# Patient Record
Sex: FEMALE | Race: ASIAN | NOT HISPANIC OR LATINO
[De-identification: names, ages, dates, MRNs, and addresses within clinical notes are randomized per-mention and may not be internally consistent; named-entity substitution may affect disease eponyms.]

---

## 2020-03-31 RX ADMIN — HEPARIN SODIUM 5000 UNIT(S): 5000 INJECTION INTRAVENOUS; SUBCUTANEOUS at 21:05

## 2021-03-08 PROBLEM — Z00.00 ENCOUNTER FOR PREVENTIVE HEALTH EXAMINATION: Status: ACTIVE | Noted: 2021-03-08

## 2021-03-09 PROBLEM — C34.90 LUNG CANCER: Status: ACTIVE | Noted: 2021-03-09

## 2021-03-11 ENCOUNTER — APPOINTMENT (OUTPATIENT)
Dept: THORACIC SURGERY | Facility: CLINIC | Age: 46
End: 2021-03-11
Payer: COMMERCIAL

## 2021-03-11 VITALS
WEIGHT: 136 LBS | BODY MASS INDEX: 23.8 KG/M2 | RESPIRATION RATE: 17 BRPM | HEART RATE: 80 BPM | OXYGEN SATURATION: 98 % | SYSTOLIC BLOOD PRESSURE: 121 MMHG | TEMPERATURE: 98 F | HEIGHT: 63.39 IN | DIASTOLIC BLOOD PRESSURE: 81 MMHG

## 2021-03-11 DIAGNOSIS — C34.90 MALIGNANT NEOPLASM OF UNSPECIFIED PART OF UNSPECIFIED BRONCHUS OR LUNG: ICD-10-CM

## 2021-03-11 DIAGNOSIS — Z85.118 PERSONAL HISTORY OF OTHER MALIGNANT NEOPLASM OF BRONCHUS AND LUNG: ICD-10-CM

## 2021-03-11 DIAGNOSIS — Z86.79 PERSONAL HISTORY OF OTHER DISEASES OF THE CIRCULATORY SYSTEM: ICD-10-CM

## 2021-03-11 PROCEDURE — 99205 OFFICE O/P NEW HI 60 MIN: CPT

## 2021-03-11 PROCEDURE — 99072 ADDL SUPL MATRL&STAF TM PHE: CPT

## 2021-03-12 ENCOUNTER — NON-APPOINTMENT (OUTPATIENT)
Age: 46
End: 2021-03-12

## 2021-03-12 PROBLEM — Z85.118 HISTORY OF MALIGNANT NEOPLASM OF LUNG: Status: RESOLVED | Noted: 2021-03-12 | Resolved: 2021-03-12

## 2021-03-12 PROBLEM — Z86.79 HISTORY OF HYPERTENSION: Status: RESOLVED | Noted: 2021-03-12 | Resolved: 2021-03-12

## 2021-03-12 RX ORDER — LORATADINE 10 MG/1
10 TABLET ORAL
Refills: 0 | Status: ACTIVE | COMMUNITY

## 2021-03-12 RX ORDER — OSIMERTINIB 80 1/1
80 TABLET, FILM COATED ORAL
Refills: 0 | Status: ACTIVE | COMMUNITY

## 2021-03-12 RX ORDER — AMLODIPINE BESYLATE 5 MG/1
5 TABLET ORAL
Refills: 0 | Status: ACTIVE | COMMUNITY

## 2021-03-15 NOTE — HISTORY OF PRESENT ILLNESS
[FreeTextEntry1] : Ms. YARELI UMANZOR, 46 year old female, never smoker, w/ hx of HTN, lung cancer diagnosed in 02/2015 in [place] and f/u with Dr. Austin Brothers since 03/11/2015 for Stage IV (M1b), She is s/p CT guided bx revealed lung adenocarcinoma, PET/CT on 05/16/2015 revealed SOCORRO mass and a few metastatic nodules in both lungs. s/p completed cyberknife treatment on 05/19/2017, s/p left lung mass bx on 02/13/2019 revealed fibroelastotic stroma with carbon deposits, negative for malignancy. D/c Tarceva on 01/15/2019 due to inefficacy and started on Targrisso 80 mg same day, given allergic reaction of rash discontinued on 02/06/2019 and restarted on 07/2019 until now. 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 \par \par CT chest 7/2/2020:\par - 3.0 x 1.5 cm left perihilar masslike consolidation (3: 56)\par - 1.0 x 0.6 cm stable right upper lobe nodule (3: 63)\par - a stable 0.4 cm pulmonary nodule LLL (3: 61) \par \par CT chest 1/5/2021:\par - 3.2 x 1.7 cm stable left perihilar masslike consolidation (5: 52)\par - 1.0 x 0.8 cm RUL pulmonary nodule (5: 60), slightly interval increase in size, previously 1.0 x 0.6 cm\par - stable 3 mm pulmonary nodule in the SOCORRO (5: 34),likely postinflammatory \par - previously noted 0.4 cm nodule in the LLL is not identified on current examination\par - small calcified granuloma noted at the lingula\par - stable node adjacent to the aortic arch measuring 0.6 cm in short axis (4:26)\par \par CT Neck 1/5/2021:\par - left local cord paralysis\par - 7 mm prevascular lymph node in short axis, which may lie along the course of the left recurrent laryngeal nerve, decreased in size, previously 9 mm \par \par Patient is here today for CT Sx consultation, referred by Dr. Austin Brothers (Hem/Onc). Patient denies shortness of breath, cough, chest pain, fever, chills, loss of appetite, weight loss, or hemoptysis.\par \par

## 2021-03-15 NOTE — CONSULT LETTER
[Consult Letter:] : I had the pleasure of evaluating your patient, [unfilled]. [( Thank you for referring [unfilled] for consultation for _____ )] : Thank you for referring [unfilled] for consultation for [unfilled] [Please see my note below.] : Please see my note below. [Consult Closing:] : Thank you very much for allowing me to participate in the care of this patient.  If you have any questions, please do not hesitate to contact me. [Sincerely,] : Sincerely, [Dear  ___] : Dear  [unfilled], [DrJuno  ___] : Dr. SIMPSON [FreeTextEntry2] : Dr. Austin Brothers (Hem/Onc/Ref)\par Dr. John Parikh (PCP) [FreeTextEntry3] : Nico Haynes MD, MPH \par System Director of Thoracic Surgery \par Director of Comprehensive Lung and Foregut Sabin \par Professor Cardiovascular & Thoracic Surgery  \par [employer] [facility] [employer] [facility]\par \par [facility]\par [address]\par Oncology [address]\[place] [address]\par Tel: [phone]\par Fax: [phone]\par

## 2021-03-15 NOTE — ASSESSMENT
[FreeTextEntry1] : Ms. YARELI UMANZOR, 46 year old female, never smoker, w/ hx of HTN, lung cancer diagnosed in 02/2015 in [place] and f/u with Dr. Austin Brothers since 03/11/2015 for Stage IV (M1b), She is s/p CT guided bx revealed lung adenocarcinoma, PET/CT on 05/16/2015 revealed SOCORRO mass and a few metastatic nodules in both lungs. s/p completed cyberknife treatment on 05/19/2017, s/p left lung mass bx on 02/13/2019 revealed fibroelastotic stroma with carbon deposits, negative for malignancy. D/c Tarceva on 01/15/2019 due to inefficacy and started on Targrisso 80 mg same day, given allergic reaction of rash discontinued on 02/06/2019 and restarted on 07/2019 until now. 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 \par \par CT chest 7/2/2020:\par - 3.0 x 1.5 cm left perihilar masslike consolidation (3: 56)\par - 1.0 x 0.6 cm stable right upper lobe nodule (3: 63)\par - a stable 0.4 cm pulmonary nodule LLL (3: 61) \par \par CT chest 1/5/2021:\par - 3.2 x 1.7 cm stable left perihilar masslike consolidation (5: 52)\par - 1.0 x 0.8 cm RUL pulmonary nodule (5: 60), slightly interval increase in size, previously 1.0 x 0.6 cm\par - stable 3 mm pulmonary nodule in the SOCORRO (5: 34),likely postinflammatory \par - previously noted 0.4 cm nodule in the LLL is not identified on current examination\par - small calcified granuloma noted at the lingula\par - stable node adjacent to the aortic arch measuring 0.6 cm in short axis (4:26)\par \par CT Neck 1/5/2021:\par - left local cord paralysis\par - 7 mm prevascular lymph node in short axis, which may lie along the course of the left recurrent laryngeal nerve, decreased in size, previously 9 mm \par \par I have reviewed the patient's medical records and diagnostic images at time of this office consultation and have made the following recommendation:\par 1. CT chest reviewed and explained to patient, RUL nodule increased in size, I recommended a Right VATS, Robotic-assisted, RUL wedge resection, MLND on 03/31/2021. Risks and benefits and alternatives explained to patient, all questions answered, patient agreed to proceed with surgery.\par 2. PFTs with Dr. Cristian Keen\par 3. PET/CT\par 4. MRI brain w/w/o contrast\par 5. Medical clearance and PST. \par \par I personally performed the services described in the documentation, reviewed the documentation recorded by the scribe in my presence and it accurately and completely records my words and actions.\par \par I, Catrachita Be NP, am scribing for and the presence of BLANCA Caicedo, the following sections HISTORY OF PRESENT ILLNESS, PAST MEDICAL/FAMILY/SOCIAL HISTORY; REVIEW OF SYSTEMS; VITAL SIGNS; PHYSICAL EXAM; DISPOSITION.

## 2021-03-15 NOTE — DATA REVIEWED
[FreeTextEntry1] : CT chest 7/2/2020:\par - 3.0 x 1.5 cm left perihilar masslike consolidation (3: 56)\par - 1.0 x 0.6 cm stable right upper lobe nodule (3: 63)\par - a stable 0.4 cm pulmonary nodule LLL (3: 61) \par \par CT chest 1/5/2021:\par - 3.2 x 1.7 cm stable left perihilar masslike consolidation (5: 52)\par - 1.0 x 0.8 cm RUL pulmonary nodule (5: 60), slightly interval increase in size, previously 1.0 x 0.6 cm\par - stable 3 mm pulmonary nodule in the SOCORRO (5: 34),likely postinflammatory \par - previously noted 0.4 cm nodule in the LLL is not identified on current examination\par - small calcified granuloma noted at the lingula\par - stable node adjacent to the aortic arch measuring 0.6 cm in short axis (4:26)\par \par CT Neck 1/5/2021:\par - left local cord paralysis\par - 7 mm prevascular lymph node in short axis, which may lie along the course of the left recurrent laryngeal nerve, decreased in size, previously 9 mm

## 2021-03-22 ENCOUNTER — OUTPATIENT (OUTPATIENT)
Dept: OUTPATIENT SERVICES | Facility: HOSPITAL | Age: 46
LOS: 1 days | End: 2021-03-22
Payer: COMMERCIAL

## 2021-03-22 VITALS
OXYGEN SATURATION: 98 % | SYSTOLIC BLOOD PRESSURE: 133 MMHG | HEART RATE: 87 BPM | DIASTOLIC BLOOD PRESSURE: 82 MMHG | TEMPERATURE: 97 F | RESPIRATION RATE: 16 BRPM | WEIGHT: 138.01 LBS | HEIGHT: 62 IN

## 2021-03-22 DIAGNOSIS — C34.90 MALIGNANT NEOPLASM OF UNSPECIFIED PART OF UNSPECIFIED BRONCHUS OR LUNG: ICD-10-CM

## 2021-03-22 DIAGNOSIS — T78.40XA ALLERGY, UNSPECIFIED, INITIAL ENCOUNTER: ICD-10-CM

## 2021-03-22 DIAGNOSIS — I10 ESSENTIAL (PRIMARY) HYPERTENSION: ICD-10-CM

## 2021-03-22 LAB
ANION GAP SERPL CALC-SCNC: 10 MMOL/L — SIGNIFICANT CHANGE UP (ref 7–14)
BLD GP AB SCN SERPL QL: NEGATIVE — SIGNIFICANT CHANGE UP
BUN SERPL-MCNC: 15 MG/DL — SIGNIFICANT CHANGE UP (ref 7–23)
CALCIUM SERPL-MCNC: 9 MG/DL — SIGNIFICANT CHANGE UP (ref 8.4–10.5)
CHLORIDE SERPL-SCNC: 102 MMOL/L — SIGNIFICANT CHANGE UP (ref 98–107)
CO2 SERPL-SCNC: 27 MMOL/L — SIGNIFICANT CHANGE UP (ref 22–31)
CREAT SERPL-MCNC: 0.86 MG/DL — SIGNIFICANT CHANGE UP (ref 0.5–1.3)
GLUCOSE SERPL-MCNC: 119 MG/DL — HIGH (ref 70–99)
HCG UR QL: NEGATIVE — SIGNIFICANT CHANGE UP
HCT VFR BLD CALC: 37.2 % — SIGNIFICANT CHANGE UP (ref 34.5–45)
HGB BLD-MCNC: 11.9 G/DL — SIGNIFICANT CHANGE UP (ref 11.5–15.5)
MCHC RBC-ENTMCNC: 30.9 PG — SIGNIFICANT CHANGE UP (ref 27–34)
MCHC RBC-ENTMCNC: 32 GM/DL — SIGNIFICANT CHANGE UP (ref 32–36)
MCV RBC AUTO: 96.6 FL — SIGNIFICANT CHANGE UP (ref 80–100)
NRBC # BLD: 0 /100 WBCS — SIGNIFICANT CHANGE UP
NRBC # FLD: 0 K/UL — SIGNIFICANT CHANGE UP
PLATELET # BLD AUTO: 211 K/UL — SIGNIFICANT CHANGE UP (ref 150–400)
POTASSIUM SERPL-MCNC: 3.6 MMOL/L — SIGNIFICANT CHANGE UP (ref 3.5–5.3)
POTASSIUM SERPL-SCNC: 3.6 MMOL/L — SIGNIFICANT CHANGE UP (ref 3.5–5.3)
RBC # BLD: 3.85 M/UL — SIGNIFICANT CHANGE UP (ref 3.8–5.2)
RBC # FLD: 14.3 % — SIGNIFICANT CHANGE UP (ref 10.3–14.5)
RH IG SCN BLD-IMP: POSITIVE — SIGNIFICANT CHANGE UP
SODIUM SERPL-SCNC: 139 MMOL/L — SIGNIFICANT CHANGE UP (ref 135–145)
WBC # BLD: 5.79 K/UL — SIGNIFICANT CHANGE UP (ref 3.8–10.5)
WBC # FLD AUTO: 5.79 K/UL — SIGNIFICANT CHANGE UP (ref 3.8–10.5)

## 2021-03-22 PROCEDURE — 93010 ELECTROCARDIOGRAM REPORT: CPT

## 2021-03-22 RX ORDER — SODIUM CHLORIDE 9 MG/ML
1000 INJECTION, SOLUTION INTRAVENOUS
Refills: 0 | Status: DISCONTINUED | OUTPATIENT
Start: 2021-03-30 | End: 2021-04-01

## 2021-03-22 NOTE — H&P PST ADULT - NSICDXPROBLEM_GEN_ALL_CORE_FT
PROBLEM DIAGNOSES  Problem: Malignant neoplasm of lung  Assessment and Plan: Pt is scheduled for right video assisted thoracoscopy, robotic assisted right upper lobe wedge resection, mediastinal lymph node dissection on 3/30/21.  Verbal and written pre op instructions reviewed with patient and pt able to verbalize understanding.   Verbal and written instructions given with chlorhexidine wash, pt able to verbalize understanding via teach back method. COVID testing scheduled preop per pt.    Sterile cup given, pt instructed to bring urine sample AM of surgery for UCG and pt able to verbalize understanding.     Problem: HTN (hypertension)  Assessment and Plan: Pt instructed to take amlodipine AM of surgery with a sip of water, pt able to verbalize understanding.        PROBLEM DIAGNOSES  Problem: Malignant neoplasm of lung  Assessment and Plan: Pt is scheduled for right video assisted thoracoscopy, robotic assisted right upper lobe wedge resection, mediastinal lymph node dissection on 3/30/21.  Verbal and written pre op instructions reviewed with patient and pt able to verbalize understanding.   Verbal and written instructions given with chlorhexidine wash, pt able to verbalize understanding via teach back method. COVID testing scheduled preop per pt.    Sterile cup given, pt instructed to bring urine sample AM of surgery for UCG and pt able to verbalize understanding.     Problem: Allergy  Assessment and Plan: OR booking notified of pt's seafood allergy status via fax.     Problem: HTN (hypertension)  Assessment and Plan: Pt instructed to take amlodipine AM of surgery with a sip of water, pt able to verbalize understanding.

## 2021-03-22 NOTE — H&P PST ADULT - ITE SK HX ROS MEA POS PC
"from taking tagrisso I've been itching for 2 years, its a side effect"/itching "from taking tagrisso I've been itching for 2 years, its a side effect, that's why I take the loratidine"/itching

## 2021-03-22 NOTE — H&P PST ADULT - GASTROINTESTINAL DETAILS
I personally performed the service described in the documentation recorded by the scribe in my presence, and it accurately and completely records my words and actions.
soft/nontender/no distention/bowel sounds normal

## 2021-03-22 NOTE — H&P PST ADULT - NEGATIVE NEUROLOGICAL SYMPTOMS
no transient paralysis/no weakness/no paresthesias/no generalized seizures/no tremors/no vertigo/no difficulty walking/no confusion

## 2021-03-22 NOTE — H&P PST ADULT - NSANTHOSAYNRD_GEN_A_CORE
No. HARLEY screening performed.  STOP BANG Legend: 0-2 = LOW Risk; 3-4 = INTERMEDIATE Risk; 5-8 = HIGH Risk

## 2021-03-22 NOTE — H&P PST ADULT - HISTORY OF PRESENT ILLNESS
45 yo female with preop dx. malignant neoplasm of lung presents to pre surgical testing.  Pt with h/o metastatic lung cancer 2015, last chemo treatment 2019 and SBRT 2020.  Pt s/p surveillance CT 1/2021 revealing right lung mass has increased in size.  Pt is scheduled for right video assisted thoracoscopy, robotic assisted right upper lobe wedge resection, mediastinal lymph node dissection.

## 2021-03-22 NOTE — H&P PST ADULT - NSICDXPASTMEDICALHX_GEN_ALL_CORE_FT
PAST MEDICAL HISTORY:  HTN (hypertension)     Lung cancer left s/p chemo(last in 2019) RT(last in 2020)    Malignant neoplasm of lung

## 2021-03-26 DIAGNOSIS — Z01.818 ENCOUNTER FOR OTHER PREPROCEDURAL EXAMINATION: ICD-10-CM

## 2021-03-27 ENCOUNTER — APPOINTMENT (OUTPATIENT)
Dept: DISASTER EMERGENCY | Facility: CLINIC | Age: 46
End: 2021-03-27

## 2021-03-28 ENCOUNTER — APPOINTMENT (OUTPATIENT)
Dept: DISASTER EMERGENCY | Facility: CLINIC | Age: 46
End: 2021-03-28

## 2021-03-28 LAB — SARS-COV-2 N GENE NPH QL NAA+PROBE: NOT DETECTED

## 2021-03-29 NOTE — ASU PATIENT PROFILE, ADULT - PMH
HTN (hypertension)    Lung cancer  left s/p chemo(last in 2019) RT(last in 2020)  Malignant neoplasm of lung

## 2021-03-29 NOTE — ASU PATIENT PROFILE, ADULT - BLOOD TRANSFUSION, PREVIOUS, PROFILE
Discussed recent events and echo with MARILYN BEAR patch advised.    Order placed.    Pt called.  No answer. LVM with echo results and request for Zio and scheduling instructions.    no

## 2021-03-30 ENCOUNTER — APPOINTMENT (OUTPATIENT)
Dept: THORACIC SURGERY | Facility: HOSPITAL | Age: 46
End: 2021-03-30

## 2021-03-30 ENCOUNTER — RESULT REVIEW (OUTPATIENT)
Age: 46
End: 2021-03-30

## 2021-03-30 ENCOUNTER — INPATIENT (INPATIENT)
Facility: HOSPITAL | Age: 46
LOS: 1 days | Discharge: HOME | End: 2021-04-01
Attending: THORACIC SURGERY (CARDIOTHORACIC VASCULAR SURGERY) | Admitting: THORACIC SURGERY (CARDIOTHORACIC VASCULAR SURGERY)
Payer: COMMERCIAL

## 2021-03-30 VITALS
RESPIRATION RATE: 16 BRPM | WEIGHT: 138.01 LBS | SYSTOLIC BLOOD PRESSURE: 107 MMHG | TEMPERATURE: 98 F | HEIGHT: 62 IN | DIASTOLIC BLOOD PRESSURE: 72 MMHG | OXYGEN SATURATION: 98 % | HEART RATE: 64 BPM

## 2021-03-30 DIAGNOSIS — C34.90 MALIGNANT NEOPLASM OF UNSPECIFIED PART OF UNSPECIFIED BRONCHUS OR LUNG: ICD-10-CM

## 2021-03-30 LAB
GRAM STN FLD: SIGNIFICANT CHANGE UP
GRAM STN FLD: SIGNIFICANT CHANGE UP
HCG UR QL: NEGATIVE — SIGNIFICANT CHANGE UP
NIGHT BLUE STAIN TISS: SIGNIFICANT CHANGE UP
SPECIMEN SOURCE: SIGNIFICANT CHANGE UP

## 2021-03-30 PROCEDURE — 32666 THORACOSCOPY W/WEDGE RESECT: CPT | Mod: AS,RT

## 2021-03-30 PROCEDURE — 32666 THORACOSCOPY W/WEDGE RESECT: CPT | Mod: RT

## 2021-03-30 PROCEDURE — 99233 SBSQ HOSP IP/OBS HIGH 50: CPT

## 2021-03-30 PROCEDURE — S2900 ROBOTIC SURGICAL SYSTEM: CPT | Mod: NC

## 2021-03-30 PROCEDURE — 88312 SPECIAL STAINS GROUP 1: CPT | Mod: 26

## 2021-03-30 PROCEDURE — 88307 TISSUE EXAM BY PATHOLOGIST: CPT | Mod: 26

## 2021-03-30 PROCEDURE — 71045 X-RAY EXAM CHEST 1 VIEW: CPT | Mod: 26

## 2021-03-30 RX ORDER — ACETAMINOPHEN 500 MG
975 TABLET ORAL ONCE
Refills: 0 | Status: COMPLETED | OUTPATIENT
Start: 2021-03-30 | End: 2021-03-30

## 2021-03-30 RX ORDER — LORATADINE 10 MG/1
1 TABLET ORAL
Qty: 0 | Refills: 0 | DISCHARGE

## 2021-03-30 RX ORDER — GABAPENTIN 400 MG/1
300 CAPSULE ORAL ONCE
Refills: 0 | Status: COMPLETED | OUTPATIENT
Start: 2021-03-30 | End: 2021-03-30

## 2021-03-30 RX ORDER — ACETAMINOPHEN 500 MG
1000 TABLET ORAL ONCE
Refills: 0 | Status: COMPLETED | OUTPATIENT
Start: 2021-03-30 | End: 2022-02-26

## 2021-03-30 RX ORDER — HYDROMORPHONE HYDROCHLORIDE 2 MG/ML
30 INJECTION INTRAMUSCULAR; INTRAVENOUS; SUBCUTANEOUS
Refills: 0 | Status: DISCONTINUED | OUTPATIENT
Start: 2021-03-30 | End: 2021-03-31

## 2021-03-30 RX ORDER — METOCLOPRAMIDE HCL 10 MG
10 TABLET ORAL EVERY 8 HOURS
Refills: 0 | Status: DISCONTINUED | OUTPATIENT
Start: 2021-03-30 | End: 2021-04-01

## 2021-03-30 RX ORDER — NALOXONE HYDROCHLORIDE 4 MG/.1ML
0.1 SPRAY NASAL
Refills: 0 | Status: DISCONTINUED | OUTPATIENT
Start: 2021-03-30 | End: 2021-03-31

## 2021-03-30 RX ORDER — OSIMERTINIB 80 1/1
1 TABLET, FILM COATED ORAL
Qty: 0 | Refills: 0 | DISCHARGE

## 2021-03-30 RX ORDER — ONDANSETRON 8 MG/1
4 TABLET, FILM COATED ORAL EVERY 6 HOURS
Refills: 0 | Status: DISCONTINUED | OUTPATIENT
Start: 2021-03-30 | End: 2021-03-31

## 2021-03-30 RX ORDER — AMLODIPINE BESYLATE 2.5 MG/1
1 TABLET ORAL
Qty: 0 | Refills: 0 | DISCHARGE

## 2021-03-30 RX ORDER — HYDROMORPHONE HYDROCHLORIDE 2 MG/ML
0.5 INJECTION INTRAMUSCULAR; INTRAVENOUS; SUBCUTANEOUS
Refills: 0 | Status: DISCONTINUED | OUTPATIENT
Start: 2021-03-30 | End: 2021-03-31

## 2021-03-30 RX ORDER — HEPARIN SODIUM 5000 [USP'U]/ML
5000 INJECTION INTRAVENOUS; SUBCUTANEOUS EVERY 8 HOURS
Refills: 0 | Status: DISCONTINUED | OUTPATIENT
Start: 2021-03-30 | End: 2021-04-01

## 2021-03-30 RX ORDER — LIDOCAINE 4 G/100G
1 CREAM TOPICAL DAILY
Refills: 0 | Status: DISCONTINUED | OUTPATIENT
Start: 2021-03-30 | End: 2021-04-01

## 2021-03-30 RX ORDER — HEPARIN SODIUM 5000 [USP'U]/ML
5000 INJECTION INTRAVENOUS; SUBCUTANEOUS ONCE
Refills: 0 | Status: COMPLETED | OUTPATIENT
Start: 2021-03-30 | End: 2021-03-30

## 2021-03-30 RX ADMIN — GABAPENTIN 300 MILLIGRAM(S): 400 CAPSULE ORAL at 12:05

## 2021-03-30 RX ADMIN — HEPARIN SODIUM 5000 UNIT(S): 5000 INJECTION INTRAVENOUS; SUBCUTANEOUS at 12:05

## 2021-03-30 RX ADMIN — HEPARIN SODIUM 5000 UNIT(S): 5000 INJECTION INTRAVENOUS; SUBCUTANEOUS at 22:28

## 2021-03-30 RX ADMIN — SODIUM CHLORIDE 30 MILLILITER(S): 9 INJECTION, SOLUTION INTRAVENOUS at 12:05

## 2021-03-30 RX ADMIN — SODIUM CHLORIDE 30 MILLILITER(S): 9 INJECTION, SOLUTION INTRAVENOUS at 18:33

## 2021-03-30 RX ADMIN — Medication 975 MILLIGRAM(S): at 12:05

## 2021-03-30 RX ADMIN — ONDANSETRON 4 MILLIGRAM(S): 8 TABLET, FILM COATED ORAL at 22:28

## 2021-03-30 RX ADMIN — LIDOCAINE 1 PATCH: 4 CREAM TOPICAL at 23:48

## 2021-03-30 RX ADMIN — HYDROMORPHONE HYDROCHLORIDE 30 MILLILITER(S): 2 INJECTION INTRAMUSCULAR; INTRAVENOUS; SUBCUTANEOUS at 18:35

## 2021-03-30 RX ADMIN — HYDROMORPHONE HYDROCHLORIDE 30 MILLILITER(S): 2 INJECTION INTRAMUSCULAR; INTRAVENOUS; SUBCUTANEOUS at 19:22

## 2021-03-30 NOTE — BRIEF OPERATIVE NOTE - OPERATION/FINDINGS
FB, robotic RVATS, RUL wedge, frozen demonstrated necrotizing granuloma FB, robotic RVATS, RUL wedge, frozen demonstrated necrotizing granuloma    I, Jarek Arambula PA-C, was the first assistant in this operation, including but not limited to docking the da Caroline robot, serving as the assistant at the surgical field while the surgeon was at the robot console, and wound closure.

## 2021-03-30 NOTE — ASU PREOP CHECKLIST - HEIGHT IN INCHES
1.  Does the patient have a moderate to severe fever?  No  2.  Has the patient had a serious reaction to a flu shot before?   No  3.  Has the patient ever had Guillian Drummond Syndrome with 6 weeks of a previous flu shot?  No  4.  Does the patient have a serious allergy to eggs?  No  5.  If person is answering for a child, is the child less that 6 months of age? No    A \"YES\" answer to any question above means the patient is not eligible to receive the vaccine.       2

## 2021-03-30 NOTE — BRIEF OPERATIVE NOTE - NSICDXBRIEFPROCEDURE_GEN_ALL_CORE_FT
PROCEDURES:  Wedge resection of right lung with video-assisted thoracoscopic surgery (VATS) 30-Mar-2021 14:54:55  Matthew Staton

## 2021-03-31 ENCOUNTER — TRANSCRIPTION ENCOUNTER (OUTPATIENT)
Age: 46
End: 2021-03-31

## 2021-03-31 LAB
ANION GAP SERPL CALC-SCNC: 10 MMOL/L — SIGNIFICANT CHANGE UP (ref 7–14)
ANISOCYTOSIS BLD QL: SLIGHT — SIGNIFICANT CHANGE UP
BASOPHILS # BLD AUTO: 0 K/UL — SIGNIFICANT CHANGE UP (ref 0–0.2)
BASOPHILS NFR BLD AUTO: 0 % — SIGNIFICANT CHANGE UP (ref 0–2)
BUN SERPL-MCNC: 14 MG/DL — SIGNIFICANT CHANGE UP (ref 7–23)
CALCIUM SERPL-MCNC: 8.3 MG/DL — LOW (ref 8.4–10.5)
CHLORIDE SERPL-SCNC: 100 MMOL/L — SIGNIFICANT CHANGE UP (ref 98–107)
CO2 SERPL-SCNC: 26 MMOL/L — SIGNIFICANT CHANGE UP (ref 22–31)
COVID-19 SPIKE DOMAIN AB INTERP: NEGATIVE — SIGNIFICANT CHANGE UP
COVID-19 SPIKE DOMAIN ANTIBODY RESULT: 0.4 U/ML — SIGNIFICANT CHANGE UP
CREAT SERPL-MCNC: 0.62 MG/DL — SIGNIFICANT CHANGE UP (ref 0.5–1.3)
EOSINOPHIL # BLD AUTO: 0 K/UL — SIGNIFICANT CHANGE UP (ref 0–0.5)
EOSINOPHIL NFR BLD AUTO: 0 % — SIGNIFICANT CHANGE UP (ref 0–6)
GIANT PLATELETS BLD QL SMEAR: PRESENT — SIGNIFICANT CHANGE UP
GLUCOSE SERPL-MCNC: 138 MG/DL — HIGH (ref 70–99)
HCT VFR BLD CALC: 41.3 % — SIGNIFICANT CHANGE UP (ref 34.5–45)
HGB BLD-MCNC: 13.1 G/DL — SIGNIFICANT CHANGE UP (ref 11.5–15.5)
IANC: 12.23 K/UL — HIGH (ref 1.5–8.5)
LYMPHOCYTES # BLD AUTO: 0.12 K/UL — LOW (ref 1–3.3)
LYMPHOCYTES # BLD AUTO: 0.9 % — LOW (ref 13–44)
MACROCYTES BLD QL: SLIGHT — SIGNIFICANT CHANGE UP
MAGNESIUM SERPL-MCNC: 2.2 MG/DL — SIGNIFICANT CHANGE UP (ref 1.6–2.6)
MANUAL SMEAR VERIFICATION: SIGNIFICANT CHANGE UP
MCHC RBC-ENTMCNC: 30.3 PG — SIGNIFICANT CHANGE UP (ref 27–34)
MCHC RBC-ENTMCNC: 31.7 GM/DL — LOW (ref 32–36)
MCV RBC AUTO: 95.6 FL — SIGNIFICANT CHANGE UP (ref 80–100)
MONOCYTES # BLD AUTO: 0.69 K/UL — SIGNIFICANT CHANGE UP (ref 0–0.9)
MONOCYTES NFR BLD AUTO: 5.2 % — SIGNIFICANT CHANGE UP (ref 2–14)
NEUTROPHILS # BLD AUTO: 12.31 K/UL — HIGH (ref 1.8–7.4)
NEUTROPHILS NFR BLD AUTO: 91.3 % — HIGH (ref 43–77)
NEUTS BAND # BLD: 0.9 % — SIGNIFICANT CHANGE UP (ref 0–6)
PLAT MORPH BLD: NORMAL — SIGNIFICANT CHANGE UP
PLATELET # BLD AUTO: 242 K/UL — SIGNIFICANT CHANGE UP (ref 150–400)
PLATELET COUNT - ESTIMATE: NORMAL — SIGNIFICANT CHANGE UP
POIKILOCYTOSIS BLD QL AUTO: SLIGHT — SIGNIFICANT CHANGE UP
POTASSIUM SERPL-MCNC: 5.2 MMOL/L — SIGNIFICANT CHANGE UP (ref 3.5–5.3)
POTASSIUM SERPL-SCNC: 5.2 MMOL/L — SIGNIFICANT CHANGE UP (ref 3.5–5.3)
RBC # BLD: 4.32 M/UL — SIGNIFICANT CHANGE UP (ref 3.8–5.2)
RBC # FLD: 13.7 % — SIGNIFICANT CHANGE UP (ref 10.3–14.5)
RBC BLD AUTO: ABNORMAL
SARS-COV-2 IGG+IGM SERPL QL IA: 0.4 U/ML — SIGNIFICANT CHANGE UP
SARS-COV-2 IGG+IGM SERPL QL IA: NEGATIVE — SIGNIFICANT CHANGE UP
SODIUM SERPL-SCNC: 136 MMOL/L — SIGNIFICANT CHANGE UP (ref 135–145)
VARIANT LYMPHS # BLD: 1.7 % — SIGNIFICANT CHANGE UP (ref 0–6)
WBC # BLD: 13.35 K/UL — HIGH (ref 3.8–10.5)
WBC # FLD AUTO: 13.35 K/UL — HIGH (ref 3.8–10.5)

## 2021-03-31 PROCEDURE — 99233 SBSQ HOSP IP/OBS HIGH 50: CPT

## 2021-03-31 PROCEDURE — 71045 X-RAY EXAM CHEST 1 VIEW: CPT | Mod: 26,76

## 2021-03-31 RX ORDER — ACETAMINOPHEN 500 MG
1000 TABLET ORAL ONCE
Refills: 0 | Status: COMPLETED | OUTPATIENT
Start: 2021-03-31 | End: 2021-03-31

## 2021-03-31 RX ADMIN — LIDOCAINE 1 PATCH: 4 CREAM TOPICAL at 06:50

## 2021-03-31 RX ADMIN — HEPARIN SODIUM 5000 UNIT(S): 5000 INJECTION INTRAVENOUS; SUBCUTANEOUS at 04:37

## 2021-03-31 RX ADMIN — Medication 400 MILLIGRAM(S): at 00:32

## 2021-03-31 RX ADMIN — LIDOCAINE 1 PATCH: 4 CREAM TOPICAL at 11:35

## 2021-03-31 RX ADMIN — Medication 400 MILLIGRAM(S): at 09:00

## 2021-03-31 RX ADMIN — Medication 400 MILLIGRAM(S): at 23:30

## 2021-03-31 RX ADMIN — Medication 1000 MILLIGRAM(S): at 09:15

## 2021-03-31 RX ADMIN — Medication 1000 MILLIGRAM(S): at 04:17

## 2021-03-31 RX ADMIN — LIDOCAINE 1 PATCH: 4 CREAM TOPICAL at 13:16

## 2021-03-31 RX ADMIN — Medication 1000 MILLIGRAM(S): at 23:45

## 2021-03-31 RX ADMIN — HEPARIN SODIUM 5000 UNIT(S): 5000 INJECTION INTRAVENOUS; SUBCUTANEOUS at 13:17

## 2021-03-31 RX ADMIN — Medication 10 MILLIGRAM(S): at 00:27

## 2021-03-31 NOTE — DISCHARGE NOTE PROVIDER - HOSPITAL COURSE
46 year old female, pmhx HTN, lung cancer 2015, last chemo treatment 2019 and SBRT 2020.  Pt s/p surveillance CT 1/2021 revealing right lung mass has increased in size. She is s/p Flexible bronchoscopy R VATS, RUL wedge resection on 3.30.21. Her chest tube was removed on 3.31.21. Follow up imaging revealed no obvious ptx. She is for discharge home today.

## 2021-03-31 NOTE — DISCHARGE NOTE PROVIDER - NSDCFUADDAPPT_GEN_ALL_CORE_FT
See Dr. Haynes's office in 7-10 days. Please call [phone] for an apt. Please get an CXR the day before your appointment and bring it with you to your follow up appointment.  Please call the office at [phone] if you have fever's chills, worsening shortness of breath, chest pain, warmth, redness or purulent discharge from the insertion site.

## 2021-03-31 NOTE — DISCHARGE NOTE PROVIDER - CARE PROVIDER_API CALL
Nico Haynes (MD)  Surgery; Thoracic Surgery  [phone] [place] [address]  Phone: [phone]  Fax: [phone]  Follow Up Time:

## 2021-03-31 NOTE — DISCHARGE NOTE PROVIDER - NSDCCPTREATMENT_GEN_ALL_CORE_FT
PRINCIPAL PROCEDURE  Procedure: Wedge resection of right lung with video-assisted thoracoscopic surgery (VATS)  Findings and Treatment:

## 2021-03-31 NOTE — DISCHARGE NOTE PROVIDER - NSDCCPCAREPLAN_GEN_ALL_CORE_FT
PRINCIPAL DISCHARGE DIAGNOSIS  Diagnosis: HTN (hypertension)  Assessment and Plan of Treatment:

## 2021-03-31 NOTE — DISCHARGE NOTE PROVIDER - NSDCMRMEDTOKEN_GEN_ALL_CORE_FT
amLODIPine 5 mg oral tablet: 1 tab(s) orally once a day  loratadine 10 mg oral tablet: 1 tab(s) orally once a day  Tagrisso 80 mg oral tablet: 1 tab(s) orally once a day   amLODIPine 5 mg oral tablet: 1 tab(s) orally once a day  CXR: CXR PA/Lateral  Dx: S/p VATS, RUL wedge resection  Please give copy of imaging to patient and fax results to Dr. Haynes&#x27;s office at [phone], thank you   loratadine 10 mg oral tablet: 1 tab(s) orally once a day  oxyCODONE 5 mg oral tablet: 1 tab(s) orally every 6 hours, As Needed -for  severe pain MDD:4   Tagrisso 80 mg oral tablet: 1 tab(s) orally once a day

## 2021-04-01 ENCOUNTER — TRANSCRIPTION ENCOUNTER (OUTPATIENT)
Age: 46
End: 2021-04-01

## 2021-04-01 VITALS
SYSTOLIC BLOOD PRESSURE: 122 MMHG | DIASTOLIC BLOOD PRESSURE: 74 MMHG | RESPIRATION RATE: 18 BRPM | OXYGEN SATURATION: 99 % | HEART RATE: 81 BPM

## 2021-04-01 LAB — SURGICAL PATHOLOGY STUDY: SIGNIFICANT CHANGE UP

## 2021-04-01 PROCEDURE — 99232 SBSQ HOSP IP/OBS MODERATE 35: CPT

## 2021-04-01 PROCEDURE — 71045 X-RAY EXAM CHEST 1 VIEW: CPT | Mod: 26

## 2021-04-01 RX ORDER — OXYCODONE HYDROCHLORIDE 5 MG/1
1 TABLET ORAL
Qty: 20 | Refills: 0
Start: 2021-04-01 | End: 2021-04-05

## 2021-04-01 RX ADMIN — LIDOCAINE 1 PATCH: 4 CREAM TOPICAL at 01:58

## 2021-04-01 NOTE — DISCHARGE NOTE NURSING/CASE MANAGEMENT/SOCIAL WORK - PATIENT PORTAL LINK FT
You can access the FollowMyHealth Patient Portal offered by [facility] by registering at the following website: [email or web address]. By joining [employer]’s FollowMyHealth portal, you will also be able to view your health information using other applications (apps) compatible with our system.

## 2021-04-01 NOTE — PROGRESS NOTE ADULT - SUBJECTIVE AND OBJECTIVE BOX
PROCEDURE: robotic RVATS, RUL wedge, 30-Mar-2021       ISSUES:   Necrotizing granuloma of lung  Lung nodule  Post op pain  Hx of lung cancer s/p chemo and radiation ([id number])  HTN        INTERVAL EVENTS:   Chest tube removed yesterday.  Post op pain uncontrolled.  better controlle dtoday.    HISTORY:   Patient reports moderate pain at chest wall incision sites which is worse with coughing and deep breathing without associated fever or dyspnea. Pain is improved with use of pain meds.       PHYSICAL EXAM:   Gen: Comfortable, No acute distress  Eyes: Sclera white, Conjunctiva normal, Eyelids normal, Pupils symmetrical   ENT: Mucous membranes moist,  ,  ,    Neck: Trachea midline,  ,  ,  ,  ,    CV: Rate regular, Rhythm regular,  ,  ,    Resp: Breath sounds clear, No accessory muscles use,   Abd: Soft, Non-distended, Non-tender, Bowel sounds normal,  ,  ,    Skin: Warm, No peripheral edema of lower extremities,  ,    : No hurley  Neuro: Moving all 4 extremities,    Psych: A&Ox3      ASSESSMENT AND PLAN:     NEURO:  Post-operative Pain - Pain control with oxycodone PO          RESPIRATORY:  Stable on room air - Incentive spirometry. Chest PT and suctioning of secretions. Out of bed to chair and ambulate with assistance. Continuous pulse oximetry for support & to prevent decompensation.               CARDIOVASCULAR:  Hemodynamically stable - Not on pressors. Continue hemodynamic monitoring.  HTN - stable. Continue home antihypertensives for now.          RENAL:  Stable – Monitor IOs and electrolytes.          GASTROINTESTINAL:  GI prophylaxis not indicated  Zofran and Reglan IV PRN for nausea  Regular consistency diet            HEMATOLOGIC:  No signs of active bleeding. Monitor Hgb in CBC in AM  DVT prophylaxis with heparin and SCDs.         INFECTIOUS DISEASE:  All surgical sites appear clean. Will monitor for fever and leukocytosis.              ENDOCRINE:  Stable – Monitor glucose fingersticks for goal 120-180.            Pertinent clinical, laboratory, radiographic, hemodynamic, echocardiographic, respiratory data, microbiologic data and chart were reviewed by myself and analyzed frequently throughout the course of the day and night by myself.    Plan discussed at length with the CTICU staff and Attending CT Surgeon.     Patient's status was discussed with patient at bedside.     _________________________  VITAL SIGNS:  Vital Signs Last 24 Hrs  T(C): 36.8 (01 Apr 2021 04:00), Max: 36.8 (31 Mar 2021 12:00)  T(F): 98.2 (01 Apr 2021 04:00), Max: 98.3 (31 Mar 2021 20:00)  HR: 77 (01 Apr 2021 07:00) (71 - 94)  BP: 127/85 (01 Apr 2021 07:00) (94/55 - 146/79)  BP(mean): 94 (01 Apr 2021 07:00) (67 - 107)  RR: 18 (01 Apr 2021 07:00) (11 - 18)  SpO2: 99% (01 Apr 2021 07:00) (95% - 99%)  I/Os:   I&O's Detail    31 Mar 2021 07:01  -  01 Apr 2021 07:00  --------------------------------------------------------  IN:    Lactated Ringers: 330 mL  Total IN: 330 mL    OUT:    Voided (mL): 2150 mL  Total OUT: 2150 mL    Total NET: -1820 mL              MEDICATIONS:  MEDICATIONS  (STANDING):  heparin   Injectable 5000 Unit(s) SubCutaneous every 8 hours  lactated ringers. 1000 milliLiter(s) (30 mL/Hr) IV Continuous <Continuous>  lidocaine   Patch 1 Patch Transdermal daily    MEDICATIONS  (PRN):  metoclopramide Injectable 10 milliGRAM(s) IV Push every 8 hours PRN nausea or vomiting not controlled by zofran      LABS:                        13.1   13.35 )-----------( 242      ( 31 Mar 2021 05:36 )             41.3     03-31    136  |  100  |  14  ----------------------------<  138<H>  5.2   |  26  |  0.62    Ca    8.3<L>      31 Mar 2021 04:52  Mg     2.2     03-31              _________________________      
 Anesthesia Pain Management Service    SUBJECTIVE:    Therapy:	  [x ] IV PCA	   [ ] Epidural           [ ] s/p Spinal Opoid              [ ] Postpartum infusion	  [ ] Patient controlled regional anesthesia (PCRA)    [ ] prn Analgesics    OBJECTIVE:   [ x] No new signs     [ ] Other:    Side Effects:  [ x] None			[ ] Other:    Assessment of Catheter Site:		[ ] Intact		[ ] Other:    ASSESSMENT/PLAN  [ ] Continue current therapy    [ x] Therapy changed to:    [ ] IV PCA       [ ] Epidural     [ x] prn Analgesics     Comments:
    PROCEDURE: robotic RVATS, RUL wedge, 30-Mar-2021       ISSUES:   Necrotizing granuloma of lung  Lung nodule  Post op pain  Chest tube in place  Hx of lung cancer s/p chemo and radiation ([id number])  HTN        INTERVAL EVENTS:   OR today. Extubated in OR. Transferred to CTICU.      HISTORY:   Patient reports moderate pain at chest wall incision sites which is worse with coughing and deep breathing without associated fever or dyspnea. Pain is improved with use of pain meds.       PHYSICAL EXAM:   Gen: Comfortable, No acute distress  Eyes: Sclera white, Conjunctiva normal, Eyelids normal, Pupils symmetrical   ENT: Mucous membranes moist,  ,  ,    Neck: Trachea midline,  ,  ,  ,  ,    CV: Rate regular, Rhythm regular,  ,  ,    Resp: Breath sounds clear, No accessory muscles use, R chest tube in place,    Abd: Soft, Non-distended, Non-tender, Bowel sounds normal,  ,  ,    Skin: Warm, No peripheral edema of lower extremities,  ,    : No hurley  Neuro: Moving all 4 extremities,    Psych: A&Ox3      ASSESSMENT AND PLAN:     NEURO:  Post-operative Pain - Pain control with PCA and Tylenol IV PRN.        RESPIRATORY:  Hypoxia - Wean nasal cannula for goal O2sat above 92. Obtain CXR. Incentive spirometry. Chest PT and frequent suctioning. Continue bronchodilators. OOB to chair & ambulate w/ assistance. Continuous pulse oximetry for support & to prevent decompensation.       Chest tube – Pleurevac regulated suctioning. Monitor chest tube output.            CARDIOVASCULAR:  Hemodynamically stable - Not on pressors. Continue hemodynamic monitoring.  HTN - stable. Hold home antihypertensives for now.          RENAL:  Stable – LR IVF 30mL/hr. Monitor IOs and electrolytes.          GASTROINTESTINAL:  GI prophylaxis with pantoprazole  Zofran and Reglan IV PRN for nausea  Regular consistency diet            HEMATOLOGIC:  No signs of active bleeding. Monitor Hgb in CBC in AM  DVT prophylaxis with SCDs.         INFECTIOUS DISEASE:  All surgical sites appear clean. Will monitor for fever and leukocytosis.              ENDOCRINE:  Stable – Monitor glucose fingersticks for goal 120-180.            Pertinent clinical, laboratory, radiographic, hemodynamic, echocardiographic, respiratory data, microbiologic data and chart were reviewed by myself and analyzed frequently throughout the course of the day and night by myself.    Plan discussed at length with the CTICU staff and Attending CT Surgeon.     Patient's status was discussed with patient at bedside.           ________________________________________________          _________________________  VITAL SIGNS:  Vital Signs Last 24 Hrs  T(C): 35.8 (30 Mar 2021 16:15), Max: 36.6 (30 Mar 2021 11:05)  T(F): 96.5 (30 Mar 2021 16:15), Max: 97.9 (30 Mar 2021 11:05)  HR: 70 (30 Mar 2021 17:15) (64 - 79)  BP: 90/63 (30 Mar 2021 16:55) (90/63 - 108/77)  BP(mean): 72 (30 Mar 2021 16:55) (72 - 88)  RR: 20 (30 Mar 2021 17:15) (12 - 21)  SpO2: 100% (30 Mar 2021 17:15) (98% - 100%)  I/Os:   I&O's Detail    30 Mar 2021 07:01  -  30 Mar 2021 17:23  --------------------------------------------------------  IN:    Lactated Ringers: 60 mL    Oral Fluid: 30 mL  Total IN: 90 mL    OUT:  Total OUT: 0 mL    Total NET: 90 mL              MEDICATIONS:  MEDICATIONS  (STANDING):  heparin   Injectable 5000 Unit(s) SubCutaneous every 8 hours  HYDROmorphone PCA (1 mG/mL) 30 milliLiter(s) PCA Continuous PCA Continuous  lactated ringers. 1000 milliLiter(s) (30 mL/Hr) IV Continuous <Continuous>    MEDICATIONS  (PRN):  HYDROmorphone PCA (1 mG/mL) Rescue Clinician Bolus 0.5 milliGRAM(s) IV Push every 15 minutes PRN for Pain Scale GREATER THAN 6  naloxone Injectable 0.1 milliGRAM(s) IV Push every 3 minutes PRN For ANY of the following changes in patient status:  A. RR LESS THAN 10 breaths per minute, B. Oxygen saturation LESS THAN 90%, C. Sedation score of 6  ondansetron Injectable 4 milliGRAM(s) IV Push every 6 hours PRN Nausea      LABS:                  _________________________          
Anesthesia Pain Management Service    SUBJECTIVE: Pt now off IV PCA without problems reported.    Therapy:	  [ X] IV PCA	   [ ] Epidural           [ ] s/p Spinal Opoid              [ ] Postpartum infusion	  [ ] Patient controlled regional anesthesia (PCRA)    [ ] prn Analgesics    Allergies    No Known Drug Allergies  Seafood (Pruritus; Rash)    Intolerances      MEDICATIONS  (STANDING):  acetaminophen  IVPB .. 1000 milliGRAM(s) IV Intermittent once  heparin   Injectable 5000 Unit(s) SubCutaneous every 8 hours  lactated ringers. 1000 milliLiter(s) (30 mL/Hr) IV Continuous <Continuous>  lidocaine   Patch 1 Patch Transdermal daily    MEDICATIONS  (PRN):  metoclopramide Injectable 10 milliGRAM(s) IV Push every 8 hours PRN nausea or vomiting not controlled by zofran  naloxone Injectable 0.1 milliGRAM(s) IV Push every 3 minutes PRN For ANY of the following changes in patient status:  A. RR LESS THAN 10 breaths per minute, B. Oxygen saturation LESS THAN 90%, C. Sedation score of 6  ondansetron Injectable 4 milliGRAM(s) IV Push every 6 hours PRN Nausea      OBJECTIVE:   [X] No new signs     [ ] Other:    Side Effects:  [X ] None			[ ] Other:    Assessment of Catheter Site:		[ ] Intact		[ ] Other:    ASSESSMENT/PLAN  [ ] Continue current therapy    [X ] Therapy changed to:    [ ] IV PCA       [ ] Epidural     [ X] prn Analgesics     Comments: PRN Oral/IV opioids and/or non-opioid adjuvant analgesics to be used at this point.    Progress Note written now but Patient was seen earlier.
YARELI UMANZOR                     [id number]    HPI:  45 yo female with preop dx. malignant neoplasm of lung presents to pre surgical testing.  Pt with h/o metastatic lung cancer 2015, last chemo treatment 2019 and SBRT 2020.  Pt s/p surveillance CT 1/2021 revealing right lung mass has increased in size.  Pt is scheduled for right video assisted thoracoscopy, robotic assisted right upper lobe wedge resection, mediastinal lymph node dissection.  (22 Mar 2021 16:40)    PROCEDURE: robotic RVATS, RUL wedge, 30-Mar-2021       ISSUES:   Necrotizing granuloma of lung  Lung nodule  Post op pain  Chest tube in place  Hx of lung cancer s/p chemo and radiation ([id number])  HTN      PAST MEDICAL & SURGICAL HISTORY:  Lung cancer  left s/p chemo(last in 2019) RT(last in 2020)    HTN (hypertension)    Malignant neoplasm of lung    No significant past surgical history              VITAL SIGNS:  Vital Signs Last 24 Hrs  T(C): 36.8 (31 Mar 2021 12:00), Max: 37.1 (31 Mar 2021 00:00)  T(F): 98.2 (31 Mar 2021 12:00), Max: 98.8 (31 Mar 2021 00:00)  HR: 94 (31 Mar 2021 12:00) (70 - 94)  BP: 109/55 (31 Mar 2021 12:00) (90/63 - 133/57)  BP(mean): 70 (31 Mar 2021 12:00) (70 - 100)  RR: 18 (31 Mar 2021 12:00) (5 - 21)  SpO2: 99% (31 Mar 2021 12:00) (90% - 100%)    I/Os:   I&O's Detail    30 Mar 2021 07:01  -  31 Mar 2021 07:00  --------------------------------------------------------  IN:    Lactated Ringers: 450 mL    Oral Fluid: 60 mL  Total IN: 510 mL    OUT:    Chest Tube (mL): 30 mL    Voided (mL): 1100 mL  Total OUT: 1130 mL    Total NET: -620 mL      31 Mar 2021 07:01  -  31 Mar 2021 12:34  --------------------------------------------------------  IN:    Lactated Ringers: 150 mL  Total IN: 150 mL    OUT:    Voided (mL): 300 mL  Total OUT: 300 mL    Total NET: -150 mL          CAPILLARY BLOOD GLUCOSE          =======================MEDICATIONS===================  MEDICATIONS  (STANDING):  acetaminophen  IVPB .. 1000 milliGRAM(s) IV Intermittent once  heparin   Injectable 5000 Unit(s) SubCutaneous every 8 hours  lactated ringers. 1000 milliLiter(s) (30 mL/Hr) IV Continuous <Continuous>  lidocaine   Patch 1 Patch Transdermal daily    MEDICATIONS  (PRN):  metoclopramide Injectable 10 milliGRAM(s) IV Push every 8 hours PRN nausea or vomiting not controlled by zofran  naloxone Injectable 0.1 milliGRAM(s) IV Push every 3 minutes PRN For ANY of the following changes in patient status:  A. RR LESS THAN 10 breaths per minute, B. Oxygen saturation LESS THAN 90%, C. Sedation score of 6  ondansetron Injectable 4 milliGRAM(s) IV Push every 6 hours PRN Nausea      PHYSICAL EXAM============================  General:                         Awake, alert, not in any distress  Neuro:                            Moving all extremities to commands.   Respiratory:	Air entry fair and  bilateral conducted sounds                                           Effort even and unlabored.  CV:		Regular rate and rhythm. Normal S1/S2                                          Distal pulses present.  Abdomen:	                     Soft, non-distended. Bowel sounds present   Skin:		No rash.  Extremities:	Warm, no cyanosis or edema.  Palpable pulses    ============================LABS=========================                        13.1   13.35 )-----------( 242      ( 31 Mar 2021 05:36 )             41.3     03-31    136  |  100  |  14  ----------------------------<  138<H>  5.2   |  26  |  0.62    Ca    8.3<L>      31 Mar 2021 04:52  Mg     2.2     03-31      =============================NEUROLOGY============================  Pain control with PCA / Tylenol IV / Toradol /    ==============================RESPIRATORY========================  Pt is on  2  L nasal canula , wean off as tolerated  Comfortable, not in any distress.  Using incentive spirometry   Monitor chest tube output  Chest tube to water seal	  Continue bronchodilators, pulmonary toilet    ============================CARDIOVASCULAR======================  Continue hemodynamic monitoring.  Not on any pressors    =====================RENAL===================  Continue LR 30CC/hr    Monitor I/Os and electrolytes    ====================GASTROINTESTINAL===================  On clears, tolerating  Continue GI prophylaxis with Pepcid / Protonix  Continue Zofran / Reglan for nausea - PRN	    ========================HEMATOLOGIC/ONCOLOGIC====================  Monitor chest tube output. No signs of active bleeding.   Follow CBC in AM    ============================INFECTIOUS DISEASE========================  Monitor for fever / leukocytosis.  All surgical incision / chest tube  sites look clean      Pt is on GI & DVT prophylaxis  OOB & ambulate       Pertinent clinical, laboratory, radiographic, hemodynamic, echocardiographic, respiratory data, microbiologic data and chart were reviewed and analyzed frequently throughout the course of the day and night  Patient seen, examined and plan discussed with CT Surgery / CTICU team during rounds.    Pt's status discussed with family at bedside, updated status        Ritu Jose DO FACEP

## 2021-04-01 NOTE — DISCHARGE NOTE NURSING/CASE MANAGEMENT/SOCIAL WORK - NSDCFUADDAPPT_GEN_ALL_CORE_FT
See Dr. Haynes's office in 7-10 days. Please call [phone] for an apt. Please get an CXR the day before your appointment and bring it with you to your follow up appointment.  Please call the office at [phone] if you have fever's chills, worsening shortness of breath, chest pain, warmth, redness or purulent discharge from the insertion site.

## 2021-04-04 LAB
CULTURE RESULTS: SIGNIFICANT CHANGE UP
CULTURE RESULTS: SIGNIFICANT CHANGE UP
SPECIMEN SOURCE: SIGNIFICANT CHANGE UP
SPECIMEN SOURCE: SIGNIFICANT CHANGE UP

## 2021-04-07 PROBLEM — I10 ESSENTIAL (PRIMARY) HYPERTENSION: Chronic | Status: ACTIVE | Noted: 2021-03-22

## 2021-04-07 PROBLEM — C34.90 MALIGNANT NEOPLASM OF UNSPECIFIED PART OF UNSPECIFIED BRONCHUS OR LUNG: Chronic | Status: ACTIVE | Noted: 2021-03-22

## 2021-04-13 PROBLEM — M31.30 NECROTIZING GRANULOMATOUS INFLAMMATION OF LUNG: Status: ACTIVE | Noted: 2021-04-13

## 2021-04-15 ENCOUNTER — APPOINTMENT (OUTPATIENT)
Dept: THORACIC SURGERY | Facility: CLINIC | Age: 46
End: 2021-04-15
Payer: COMMERCIAL

## 2021-04-15 VITALS
RESPIRATION RATE: 17 BRPM | HEART RATE: 98 BPM | WEIGHT: 138 LBS | BODY MASS INDEX: 24.15 KG/M2 | OXYGEN SATURATION: 97 % | SYSTOLIC BLOOD PRESSURE: 131 MMHG | DIASTOLIC BLOOD PRESSURE: 86 MMHG | TEMPERATURE: 97.2 F

## 2021-04-15 DIAGNOSIS — M31.30 WEGENER'S GRANULOMATOSIS W/OUT RENAL INVOLVEMENT: ICD-10-CM

## 2021-04-15 PROCEDURE — 99024 POSTOP FOLLOW-UP VISIT: CPT

## 2021-05-19 LAB
CULTURE RESULTS: SIGNIFICANT CHANGE UP
SPECIMEN SOURCE: SIGNIFICANT CHANGE UP

## 2022-07-09 NOTE — PATIENT PROFILE ADULT - NSPROPOAURINARYCATHETER_GEN_A_NUR
Orders for admission, patient is aware of plan and ready to go upstairs. Any questions, please call ED RN Cheikh Banuelos at extension [phone].      Patient Covid vaccination status: Unvaccinated     COVID Test Ordered in ED: None    COVID Suspicion at Admission: N/A    Running Infusions:      Mental Status/LOC at time of transport: AOx2    Other pertinent information:   CIWA score: N/A   NIH score:  N/A no

## 2023-12-07 NOTE — ASU PATIENT PROFILE, ADULT - CAREGIVER NAME
Additional Notes: resolved on breast but still has systemic disease and is being followed and treated by Sebastian for this Render Risk Assessment In Note?: no Detail Level: Simple mind vivar spouse

## 2025-04-02 NOTE — BRIEF OPERATIVE NOTE - VENOUS THROMBOEMBOLISM PROPHYLAXIS THERAPY
Nutrition Rx & Recommendations:  Diet: enteral nutrition as 1' source of nutrition  Stay hydrated by sipping fluids of choice/tolerance throughout the day.   Follow proper oral care; Try baking soda/salt water rinse recipe (mix 3/4 tsp salt + 1 tsp baking soda + 1 qt water; rinse with plain water after using) in Eating Hints book (pg 18).  Brush your teeth before/after meals & before bed.  Weigh yourself regularly. If you notice weight loss, make an effort to increase your daily food/calorie intake. If you continue to notice loss after these efforts, reach out to your dietitian to establish a plan to stabilize weight.   Continue with Ensure Plus/Complete 2 times daily. Can try drinking orally, or use through PEG if tolerated better  Choose liquids with calories: whole milk, Fairlife milk (higher protein/lactose-free milk), chocolate milk, drinkable yogurt, kefir, 100% fruit juice, diluted juice, bone broth (higher protein broth), creamy soups, sports drinks (Gatorade, Poweraide, Pedialyte, etc.), Italian ice, popsicles, milkshakes, smoothies, oral nutrition supplements (Ensure, Boost, etc.), gelatin/Jello, etc.  Soft/easy to swallow foods that are high in calories and protein: casseroles, chicken/egg/tuna salad with extra garcia to add calories and moisture, oatmeal/cream of wheat made with whole milk, cottage cheese, whole milk Greek yogurt, scrambled eggs with cheese, avocado, macaroni and cheese, mashed potatoes made with butter and whole milk or dry milk powder, ground meat with extra sauce/gravy, canned fruit, peanut butter, cream soups (cream of chicken, cream of mushroom, broccoli cheddar), pudding made with whole milk, custard, ice cream, banana, milkshakes/smoothies, Review page 47 of Eating Hints Book for more ideas.   TF plan -   TF Goal:  Continue Sh[employer] Micro[employer]ion 1.5, at lease 2 cartons/day  Contact RD for substitutions  Call [facility] when you have 10 days left of TF supplies: [phone], option 3  (customer support).    Follow Up Plan: during infusion on 4/16/25   Recommend Referral to Other Providers: none at this time   scds